# Patient Record
Sex: FEMALE | Race: WHITE | URBAN - METROPOLITAN AREA
[De-identification: names, ages, dates, MRNs, and addresses within clinical notes are randomized per-mention and may not be internally consistent; named-entity substitution may affect disease eponyms.]

---

## 2018-08-21 ENCOUNTER — TELEPHONE (OUTPATIENT)
Dept: DERMATOLOGY | Facility: CLINIC | Age: 18
End: 2018-08-21

## 2018-08-21 NOTE — TELEPHONE ENCOUNTER
[facility] Call [facility]    Phone Message    May a detailed message be left on voicemail: yes    Reason for Call: QUAN Chandra from [facility] wants to talk to a nurse or someone about this pt's particular case of leukocytoclastic vasculitis. She can be called back at [phone].      Action Taken: Message routed to:  [facility] ([facility] DERM

## 2018-08-21 NOTE — TELEPHONE ENCOUNTER
I spoke with Yuliet Chandra who is the referring provider. She would like to speak with the RICHARD to see if patient can be seen soon for this skin lesion of leukocytoclastic vasculitis.    This would be a provider to provider referral.      Yuliet Chandra  Cell [phone]    Will forward to RICHARD Dr. Israel.    Rosey Cordoba RN

## 2018-08-23 ENCOUNTER — TELEPHONE (OUTPATIENT)
Dept: DERMATOLOGY | Facility: CLINIC | Age: 18
End: 2018-08-23

## 2018-08-30 ENCOUNTER — OFFICE VISIT (OUTPATIENT)
Dept: DERMATOLOGY | Facility: CLINIC | Age: 18
End: 2018-08-30
Payer: COMMERCIAL

## 2018-08-30 DIAGNOSIS — I77.6 VASCULITIS (H): ICD-10-CM

## 2018-08-30 DIAGNOSIS — I77.6 VASCULITIS (H): Primary | ICD-10-CM

## 2018-08-30 LAB
HBV CORE AB SERPL QL IA: NONREACTIVE
HBV SURFACE AB SERPL IA-ACNC: 0.7 M[IU]/ML
HBV SURFACE AG SERPL QL IA: NONREACTIVE
HCV AB SERPL QL IA: NONREACTIVE
HIV 1+2 AB+HIV1 P24 AG SERPL QL IA: NONREACTIVE

## 2018-08-30 ASSESSMENT — PAIN SCALES - GENERAL: PAINLEVEL: NO PAIN (0)

## 2018-08-30 NOTE — PATIENT INSTRUCTIONS
You have had the following lab work done which was within normal limits:  TANA   CHROMATIN DNP ANTIBODY   CHROMATIN DNP AB, QUALITATIVE   Ribosomal P Antibody   RIBOSOMAL P AB, QUALITATIVE   SS-A AB, QUALITATIVE   SS-B AB, QUALITATIVE   SMRNP Antibody   SMRNP AB, QUALITATIVE   SCL 70 IGG ANTIBODY   SCL 70 AB, QUALITATIVE   JO1 IGG ANTIBODY   JO1 IGG AB, QUALITATIVE   CENTROMERE B AB, QUALITATIVE   SM IGG ANTIBODY   SM IGG AB, QUALITATIVE   RNP AB, QUALITATIVE   DNA Antibody, Double-Stranded   DNA AB, QUALITATIVE   Rheumatoid Factor   Rheumatoid Factor Qualitative     We will do the following lab tests today:   HIV, hep B and C, ANCA and cryoglobulins (this is essentially the rest of a vasculitis work up)    We will call you back with results.    Please give us a call when you have more active areas and we will get you in as soon as possible, preferably the same day.

## 2018-08-30 NOTE — MR AVS SNAPSHOT
After Visit Summary   8/30/2018    Diana Elaine    MRN: [id number]           Patient Information     Date Of Birth          2000        Visit Information        Provider Department      8/30/2018 10:30 AM Alivia Amaya MD [facility] Dermatology        Today's Diagnoses     Vasculitis (H)    -  1      Care Instructions    You have had the following lab work done which was within normal limits:  TANA   CHROMATIN DNP ANTIBODY   CHROMATIN DNP AB, QUALITATIVE   Ribosomal P Antibody   RIBOSOMAL P AB, QUALITATIVE   SS-A AB, QUALITATIVE   SS-B AB, QUALITATIVE   SMRNP Antibody   SMRNP AB, QUALITATIVE   SCL 70 IGG ANTIBODY   SCL 70 AB, QUALITATIVE   JO1 IGG ANTIBODY   JO1 IGG AB, QUALITATIVE   CENTROMERE B AB, QUALITATIVE   SM IGG ANTIBODY   SM IGG AB, QUALITATIVE   RNP AB, QUALITATIVE   DNA Antibody, Double-Stranded   DNA AB, QUALITATIVE   Rheumatoid Factor   Rheumatoid Factor Qualitative     We will do the following lab tests today:   HIV, hep B and C, ANCA and cryoglobulins (this is essentially the rest of a vasculitis work up)    We will call you back with results.    Please give us a call when you have more active areas and we will get you in as soon as possible, preferably the same day.           Follow-ups after your visit        Follow-up notes from your care team     Return if symptoms worsen or fail to improve.      Your next 10 appointments already scheduled     Aug 30, 2018 10:30 AM CDT   (Arrive by 10:15 AM)   New Patient Visit with Alivia Amaya MD   [facility] ([facility])    [address] [place] [id number] [phone]              Future tests that were ordered for you today     Open Future Orders        Priority Expected Expires Ordered    Hepatitis B core antibody Routine  8/31/2019 8/30/2018    Hepatitis B Surface Antibody Routine  8/30/2019 8/30/2018    Hepatitis B surface antigen Routine  8/30/2019  2018    Hepatitis C antibody Routine  2019    Cryoglobulin identification Routine  2019    ANCA IgG by IFA with Reflex to Titer Routine  2019            Who to contact     Please call your clinic at [phone] to:    Ask questions about your health    Make or cancel appointments    Discuss your medicines    Learn about your test results    Speak to your doctor            Additional Information About Your Visit        [employer] Information     [employer] is an electronic gateway that provides easy, online access to your medical records. With [employer], you can request a clinic appointment, read your test results, renew a prescription or communicate with your care team.     To sign up for [employer] visit the website at [email or web address]   You will be asked to enter the access code listed below, as well as some personal information. Please follow the directions to create your username and password.     Your access code is: [email or web address]  Expires: 2018  6:30 AM     Your access code will  in 90 days. If you need help or a new code, please contact your [facility] or call [phone] for assistance.        Care EveryWhere ID     This is your Care EveryWhere ID. This could be used by other organizations to access your [facility] medical records  [id number]         Blood Pressure from Last 3 Encounters:   07/04/15 117/71    Weight from Last 3 Encounters:   No data found for Wt              We Performed the Following     HIV Antigen Antibody Combo        Primary Care Provider Office Phone # Fax #    Shelton Gonzalez PA-C [phone]       [facility] [address]  [facility] [address]        Equal Access to Services     FLORES MRATINEZ : Hadii aad ku buddy Soizabella, waaxda luqadaha, qaybta kaalnahomi de jesus. So [facility] [phone].    ATENCIÓN: Si debra tanner,  tiene a leo disposición servicios gratuitos de asistencia lingüística. Lelo craven [phone].    We comply with applicable federal civil rights laws and [place] laws. We do not discriminate on the basis of race, color, national origin, age, disability, sex, sexual orientation, or gender identity.            Thank you!     Thank you for choosing [facility]  for your care. Our goal is always to provide you with excellent care. Hearing back from our patients is one way we can continue to improve our services. Please take a few minutes to complete the written survey that you may receive in the mail after your visit with us. Thank you!             Your Updated Medication List - Protect others around you: Learn how to safely use, store and throw away your medicines at www.disposemymeds.org.      Notice  As of 8/30/2018 10:26 AM    You have not been prescribed any medications.

## 2018-08-30 NOTE — LETTER
8/30/2018       RE: Diana Elaine  [address] [facility] [address]     Dear Colleague,    Thank you for referring your patient, Diana Elaine, to the [facility] DER[facility] at [facility]. Please see a copy of my visit note below.    [facility] Dermatology Note      Dermatology Problem List:  1. Relapsing, recurring tender red bumps on the lower extremities, s/p outside biopsy X2 (at [facility] four years ago and in [place] [address]), most recent biopsy showing leukocytoclastic vasculitis (results in care everywhere). DDx robust arthropod bite versus other  - complete lab evaluation today: HIV, Hep B and C, ANCA, and cryoglobulins  - please allow patient to be seen same day if she calls with active lesions   2. linear scars of left volar wrist and forearm distal to tattoo, suspicious for past self-inflicted trauma. PHQ score of 1 at recent visit (8/30/18), not explicitly addressed in visit, recommended establishing care with a primary care physician in her area to address routine medical needs and health maintenance in general    Encounter Date: Aug 30, 2018    CC:   Chief Complaint   Patient presents with     Derm Problem     Diana is there today for leukocytoclastic vasculitis     History of Present Illness:  Ms. Diana Elaine is a 18 year old female who presents at the request of Yuliet Chandra CNP for evaluation and management of leukocytoclastic vasculitis. Apparently the patient was seen by Yuliet Chandra CNP about a week ago in clinic and a biopsy was performed 8/15/18 showing leukocytoclastic vasculitis (report available in Epic). She had the following labs performed (below) which were normal. She was placed on augmentin for some drainage from the punch biopsy, wound cultures were negative. She did also have some significant leg pain and was unable to stand on her leg. We recommended further lab work up including HIV, hep B and C, ANCA and  "cyroglobulins as part of a full vasculitis work up.     Per the patient, for at least the last four years she has been getting tender to touch red bumps on her lower extremities that will come and go, lasting several days, and resolving without much issue only to crop up again on the opposite leg. She does have some chronic headaches which are worse with menses, although now she has the nexplanon and does not get regular menses. She also has some chronic abdominal cramps associated with menses (now has nexplanon as above). She does have a family history of lupus on mom's side and is worried that something worrisome might be going on in her skin. She is here today with dad, who also provided history. She is otherwise healthy and takes no regular prescribed medications.     She was seen at [facility] four years ago for a similar issue and had a biopsy performed which she states showed \"nothing.\"    TANA   CHROMATIN DNP ANTIBODY   CHROMATIN DNP AB, QUALITATIVE   Ribosomal P Antibody   RIBOSOMAL P AB, QUALITATIVE   SS-A AB, QUALITATIVE   SS-B AB, QUALITATIVE   SMRNP Antibody   SMRNP AB, QUALITATIVE   SCL 70 IGG ANTIBODY   SCL 70 AB, QUALITATIVE   JO1 IGG ANTIBODY   JO1 IGG AB, QUALITATIVE   CENTROMERE B AB, QUALITATIVE   SM IGG ANTIBODY   SM IGG AB, QUALITATIVE   RNP AB, QUALITATIVE   DNA Antibody, Double-Stranded   DNA AB, QUALITATIVE   Rheumatoid Factor   Rheumatoid Factor Qualitative       Past Medical History:   There is no problem list on file for this patient.    Past Medical History:   Diagnosis Date     Panic attack      History reviewed. No pertinent surgical history.    Social History:  Social History     Social History     Marital status: Single     Spouse name: N/A     Number of children: N/A     Years of education: N/A     Occupational History     Not on file.     Social History Main Topics     Smoking status: Never Smoker     Smokeless tobacco: Never Used      Comment: e-cig     Alcohol use No     Drug use: " No     Sexual activity: Not on file     Other Topics Concern     Not on file     Social History Narrative       Family History:  Family History   Problem Relation Age of Onset     Melanoma No family hx of      Skin Cancer No family hx of        Medications:  No current outpatient prescriptions on file.        Allergies   Allergen Reactions     Norco [Hydrocodone-Acetaminophen] Other (See Comments)     seizure         Review of Systems:  -As per HPI  -Constitutional: The patient denies fatigue, fevers, chills, unintended weight loss, and night sweats.  -Skin: As above in HPI. No additional skin concerns.    Physical exam:  Vitals: There were no vitals taken for this visit.  GEN: This is a well developed, well-nourished female in no acute distress, appears anxious and tearful  SKIN: Full skin, which includes the head/face, both arms, chest, back, abdomen,both legs, genitalia and/or groin buttocks, digits and/or nails, was examined.  -there are superifical acneiform papules with intermixed open and closed comedones on the face and back, mild.   -linear scars of left volar wrist and forearm distal to tattoo, no evidence of recent trauma   -there is a 4 mm well circumscribed circular healing wound without evidence of infection on the left thigh at site of biopsy  -No other lesions of concern on areas examined.     Impression/Plan:  1. Relapsing, recurring tender red bumps on the lower extremities, s/p outside biopsy X2 (at [facility] four years ago and in [place] 2018), most recent biopsy showing leukocytoclastic vasculitis (results in care everywhere). Extensive autoimmune work up is negative to date. DDx robust arthropod bite versus other    complete lab evaluation today: HIV, Hep B and C, ANCA, and cryoglobulins    please allow patient to be seen same day if she calls with active lesions     we will have her sign a release of information to view prior records     2. linear scars of left volar wrist and forearm  distal to tattoo, suspicious for past self-inflicted trauma. PHQ score of 1 at recent visit (8/30/18), not explicitly addressed in visit, recommended establishing care with a primary care physician in her area to address routine medical needs and health maintenance in general    CC Dr. Chandra on close of this encounter.  Follow-up prn for new or changing lesions.    Dr. Breaux staffed the patient.    Staff Involved:  Resident(Alivia Amaya)/Staff(as above)    Answers for HPI/ROS submitted by the patient on 8/30/2018   PHQ-2 Score: 1      Again, thank you for allowing me to participate in the care of your patient.      Sincerely,    Alivia Amaya MD

## 2018-08-30 NOTE — PROGRESS NOTES
[facility] Dermatology Note      Dermatology Problem List:  1. Relapsing, recurring tender red bumps on the lower extremities, s/p outside biopsy X2 (at [facility] four years ago and in [place] 2018), most recent biopsy showing leukocytoclastic vasculitis (results in care everywhere). DDx robust arthropod bite versus other  - complete lab evaluation today: HIV, Hep B and C, ANCA, and cryoglobulins  - please allow patient to be seen same day if she calls with active lesions   2. linear scars of left volar wrist and forearm distal to tattoo, suspicious for past self-inflicted trauma. PHQ score of 1 at recent visit (8/30/18), not explicitly addressed in visit, recommended establishing care with a primary care physician in her area to address routine medical needs and health maintenance in general    Encounter Date: Aug 30, 2018    CC:   Chief Complaint   Patient presents with     Derm Problem     Diana is there today for leukocytoclastic vasculitis     History of Present Illness:  Ms. Diana Elaine is a 18 year old female who presents at the request of Yuliet Chandra CNP for evaluation and management of leukocytoclastic vasculitis. Apparently the patient was seen by Yuliet Chandra CNP about a week ago in clinic and a biopsy was performed 8/15/18 showing leukocytoclastic vasculitis (report available in Epic). She had the following labs performed (below) which were normal. She was placed on augmentin for some drainage from the punch biopsy, wound cultures were negative. She did also have some significant leg pain and was unable to stand on her leg. We recommended further lab work up including HIV, hep B and C, ANCA and cyroglobulins as part of a full vasculitis work up.     Per the patient, for at least the last four years she has been getting tender to touch red bumps on her lower extremities that will come and go, lasting several days, and resolving without much issue only to crop up again  "on the opposite leg. She does have some chronic headaches which are worse with menses, although now she has the nexplanon and does not get regular menses. She also has some chronic abdominal cramps associated with menses (now has nexplanon as above). She does have a family history of lupus on mom's side and is worried that something worrisome might be going on in her skin. She is here today with dad, who also provided history. She is otherwise healthy and takes no regular prescribed medications.     She was seen at [facility] four years ago for a similar issue and had a biopsy performed which she states showed \"nothing.\"    TANA   CHROMATIN DNP ANTIBODY   CHROMATIN DNP AB, QUALITATIVE   Ribosomal P Antibody   RIBOSOMAL P AB, QUALITATIVE   SS-A AB, QUALITATIVE   SS-B AB, QUALITATIVE   SMRNP Antibody   SMRNP AB, QUALITATIVE   SCL 70 IGG ANTIBODY   SCL 70 AB, QUALITATIVE   JO1 IGG ANTIBODY   JO1 IGG AB, QUALITATIVE   CENTROMERE B AB, QUALITATIVE   SM IGG ANTIBODY   SM IGG AB, QUALITATIVE   RNP AB, QUALITATIVE   DNA Antibody, Double-Stranded   DNA AB, QUALITATIVE   Rheumatoid Factor   Rheumatoid Factor Qualitative       Past Medical History:   There is no problem list on file for this patient.    Past Medical History:   Diagnosis Date     Panic attack      History reviewed. No pertinent surgical history.    Social History:  Social History     Social History     Marital status: Single     Spouse name: N/A     Number of children: N/A     Years of education: N/A     Occupational History     Not on file.     Social History Main Topics     Smoking status: Never Smoker     Smokeless tobacco: Never Used      Comment: e-cig     Alcohol use No     Drug use: No     Sexual activity: Not on file     Other Topics Concern     Not on file     Social History Narrative       Family History:  Family History   Problem Relation Age of Onset     Melanoma No family hx of      Skin Cancer No family hx of        Medications:  No current " outpatient prescriptions on file.        Allergies   Allergen Reactions     Norco [Hydrocodone-Acetaminophen] Other (See Comments)     seizure         Review of Systems:  -As per HPI  -Constitutional: The patient denies fatigue, fevers, chills, unintended weight loss, and night sweats.  -Skin: As above in HPI. No additional skin concerns.    Physical exam:  Vitals: There were no vitals taken for this visit.  GEN: This is a well developed, well-nourished female in no acute distress, appears anxious and tearful  SKIN: Full skin, which includes the head/face, both arms, chest, back, abdomen,both legs, genitalia and/or groin buttocks, digits and/or nails, was examined.  -there are superifical acneiform papules with intermixed open and closed comedones on the face and back, mild.   -linear scars of left volar wrist and forearm distal to tattoo, no evidence of recent trauma   -there is a 4 mm well circumscribed circular healing wound without evidence of infection on the left thigh at site of biopsy  -No other lesions of concern on areas examined.     Impression/Plan:  1. Relapsing, recurring tender red bumps on the lower extremities, s/p outside biopsy X2 (at [facility] four years ago and in [place] 2018), most recent biopsy showing leukocytoclastic vasculitis (results in care everywhere). Extensive autoimmune work up is negative to date. DDx robust arthropod bite versus other    complete lab evaluation today: HIV, Hep B and C, ANCA, and cryoglobulins    please allow patient to be seen same day if she calls with active lesions     we will have her sign a release of information to view prior records     2. linear scars of left volar wrist and forearm distal to tattoo, suspicious for past self-inflicted trauma. PHQ score of 1 at recent visit (8/30/18), not explicitly addressed in visit, recommended establishing care with a primary care physician in her area to address routine medical needs and health maintenance in  general    CC Dr. Chnadra on close of this encounter.  Follow-up prn for new or changing lesions.    Dr. Breaux staffed the patient.    Staff Involved:  Resident(Alivia Amaya)/Staff(as above)    Answers for HPI/ROS submitted by the patient on 8/30/2018   PHQ-2 Score: 1    Patient was seen and examined with the dermatology resident. I agree with the history, review of systems, physical examination, assessments and plan.    Agnieszka Breaux MD  Professor and  Chair  Department of Dermatology  [facility]

## 2018-08-31 LAB
ANCA AB PATTERN SER IF-IMP: NORMAL
C-ANCA TITR SER IF: NORMAL {TITER}

## 2018-09-04 LAB — CRYOGLOB SER QL: ABNORMAL %

## 2018-09-06 LAB
ALBUMIN SERPL ELPH-MCNC: NEGATIVE G/DL
CRYOGLOB IGA & IGG & IGM SER-IMP: NORMAL
CRYOGLOB TYP SER IFE: NEGATIVE

## 2018-09-10 DIAGNOSIS — I77.6 VASCULITIS (H): Primary | ICD-10-CM

## 2018-09-17 ENCOUNTER — TELEPHONE (OUTPATIENT)
Dept: DERMATOLOGY | Facility: CLINIC | Age: 18
End: 2018-09-17

## 2018-09-17 NOTE — TELEPHONE ENCOUNTER
Called to discuss lab results with patient and to see if we can recheck her cryoglobulins at the end of this month. No answer. No VM left as no identifying voicemail in place to ensure confidentiality. Will try again.    Respectfully,    Alivia Amaya  PGY-2 Dermatology Resident  [facility] Department of Dermatology

## 2018-10-01 PROBLEM — I77.6 VASCULITIS (H): Status: ACTIVE | Noted: 2018-10-01

## 2018-10-10 ENCOUNTER — TELEPHONE (OUTPATIENT)
Dept: DERMATOLOGY | Facility: CLINIC | Age: 18
End: 2018-10-10

## 2018-11-29 ENCOUNTER — TELEPHONE (OUTPATIENT)
Dept: DERMATOLOGY | Facility: CLINIC | Age: 18
End: 2018-11-29

## 2019-01-14 ENCOUNTER — TELEPHONE (OUTPATIENT)
Dept: DERMATOLOGY | Facility: CLINIC | Age: 19
End: 2019-01-14